# Patient Record
Sex: FEMALE | Race: BLACK OR AFRICAN AMERICAN | NOT HISPANIC OR LATINO | Employment: UNEMPLOYED | URBAN - METROPOLITAN AREA
[De-identification: names, ages, dates, MRNs, and addresses within clinical notes are randomized per-mention and may not be internally consistent; named-entity substitution may affect disease eponyms.]

---

## 2019-10-04 ENCOUNTER — TELEPHONE (OUTPATIENT)
Dept: NEUROLOGY | Facility: CLINIC | Age: 24
End: 2019-10-04

## 2019-10-17 ENCOUNTER — OFFICE VISIT (OUTPATIENT)
Dept: NEUROLOGY | Facility: CLINIC | Age: 24
End: 2019-10-17

## 2019-10-17 VITALS — SYSTOLIC BLOOD PRESSURE: 130 MMHG | DIASTOLIC BLOOD PRESSURE: 68 MMHG | HEART RATE: 89 BPM | WEIGHT: 140 LBS

## 2019-10-17 DIAGNOSIS — G40.909 EPILEPSY UNDETERMINED AS TO FOCAL OR GENERALIZED (HCC): Primary | ICD-10-CM

## 2019-10-17 PROCEDURE — 99215 OFFICE O/P EST HI 40 MIN: CPT | Performed by: PSYCHIATRY & NEUROLOGY

## 2019-10-17 RX ORDER — LEVETIRACETAM 250 MG/1
250 TABLET ORAL 2 TIMES DAILY
Status: ON HOLD | COMMUNITY
End: 2019-10-17 | Stop reason: SDUPTHER

## 2019-10-17 RX ORDER — LEVETIRACETAM 500 MG/1
500 TABLET ORAL EVERY 12 HOURS SCHEDULED
Status: ON HOLD | COMMUNITY
End: 2019-10-17 | Stop reason: SDUPTHER

## 2019-10-17 RX ORDER — LEVETIRACETAM 250 MG/1
250 TABLET ORAL
Qty: 90 TABLET | Refills: 3 | Status: SHIPPED | OUTPATIENT
Start: 2019-10-17 | End: 2019-10-28 | Stop reason: SDUPTHER

## 2019-10-17 RX ORDER — LEVETIRACETAM 500 MG/1
500 TABLET ORAL EVERY 12 HOURS SCHEDULED
Qty: 180 TABLET | Refills: 3 | Status: SHIPPED | OUTPATIENT
Start: 2019-10-17 | End: 2019-10-17 | Stop reason: SDUPTHER

## 2019-10-17 RX ORDER — LEVETIRACETAM 500 MG/1
500 TABLET ORAL EVERY 12 HOURS SCHEDULED
Qty: 180 TABLET | Refills: 3 | Status: SHIPPED | OUTPATIENT
Start: 2019-10-17 | End: 2019-10-28 | Stop reason: SDUPTHER

## 2019-10-17 RX ORDER — MEDROXYPROGESTERONE ACETATE 150 MG/ML
150 INJECTION, SUSPENSION INTRAMUSCULAR
COMMUNITY
End: 2021-12-10

## 2019-10-17 NOTE — PROGRESS NOTES
[place] [address] [facility] [address]  Initial Consultation    Impression/Plan    Ms Dave Bashir is a 25 y o  female with epilepsy due to unknown cause manifest as GTC seizures  Her neurological exam is normal   She had 2 seizures 10/2/2019 likely in the setting of some degree sleep deprivation in the postpartum period  She is experiencing daytime sedation related to levetiracetam   Will try decreasing morning dose to 500 mg and she will continue 750 mg at night  The dose could potentially be decreased further to 500 mg twice daily if potential side effects to improve  We discussed the pathophysiology of epilepsy/seizure and seizure safety/precautions  We discussed factors that can lower seizure threshold and the side effects of antiepileptic medications  Patient Instructions   1  Decrease levetiracetam to 500 mg in the morning and 750 mg at night  2  Let us know if there are still side effects in one week  3  Let us know if there are seizures  4  Return in 2 months  Diagnoses and all orders for this visit:    Epilepsy undetermined as to focal or generalized ([facility] [address] )  -     Discontinue: levETIRAcetam (KEPPRA) 500 mg tablet; Take 1 tablet (500 mg total) by mouth every 12 (twelve) hours  -     levETIRAcetam (KEPPRA) 250 mg tablet; Take 1 tablet (250 mg total) by mouth daily at bedtime  -     sertraline (ZOLOFT) 50 mg tablet; Take 1 tablet (50 mg total) by mouth daily  -     levETIRAcetam (KEPPRA) 500 mg tablet; Take 1 tablet (500 mg total) by mouth every 12 (twelve) hours          Dayanna Marie is a 25 y o  [place] speaking female presenting to the [place] [address] [facility] for hospital follow up regarding epilepsy  She has with her  a baby  Her  also only speaks [place]  An  was utilized by phone today  She was seen in consultation by our neuro hospitalist team at [facility] on 10/2/2019    Records from the [facility] admission were reviewed  At the time she was 6 months postpartum  Upon arriving at home the patient's  found her confused and altered  Later, he found her on the ground foaming at the mouth with eyes rolled backwards  He reported that she was thrashing around  She was unresponsive afterwards and was taken to the emergency department, arriving about 20 minutes later  About 5 minutes after arrival to the emergency department she had a second apparent convulsion  Emergency department note described the event as eyes rolling backwards with loss of consciousness  She was completely unresponsive  Duration estimated at 2 minutes  She was given 1 mg of Ativan and 1500 mg of levetiracetam      Her seizure prior to the most recent event was about 6 months ago  Seizures began when she was about 21years old  She was living in [place] at the time  She describes a warning of fatigue prior to her seizures  There has been about 6-7 seizures in her life  She denied ever taking an antiepileptic medication prior to her 10/1/2019 seizure  When she was seen in the hospital in October she reported that her  had witnessed her thrashing around file only in her sleep lately which had happened in the past, but was more frequent recently  Current AEDs:  Levetiracetam 750 mg bid  Medication side effects:  Tired  Medication adherence: Yes    Event/Seizure semiology:  Generalized tonic-clonic seizure (about 10 lifetime events, last on 10/2/2019 which prompted AED for the first time)    Special Features  Status epilepticus: no  Self Injury Seizures: no  Precipitating Factors: None known    Epilepsy Risk Factors:  None    Prior AEDs:  None    Prior Evaluation:  Brain MRI 10/3/2019 [facility] with without contrast: " Abnormal signal in the white matter of the posterior parietal lobes on the right and left side  Etiology includes demyelinating diseases multiple sclerosis   Vasculitis Lyme disease to be considered  "(I have not personally reviewed the images)    Routine EEG 10/2/2019 performed [facility]:  "Study was technically limited  There was no clear epileptiform feature "  (I have not personally reviewed the study)      History Reviewed: The following were reviewed and updated as appropriate: allergies, current medications, past family history, past medical history, past social history, past surgical history and problem list     Psychiatric History:  Depressed mood during postpartum period    Social History:   Driving: No  Lives Alone: No  Occupation: unknown occupation    ROS:  Constitutional: Negative  Negative for appetite change and fever  HENT: Negative  Negative for hearing loss, tinnitus, trouble swallowing and voice change  Eyes: Negative  Negative for photophobia and pain  Respiratory: Negative  Negative for shortness of breath  Cardiovascular: Negative  Negative for palpitations  Gastrointestinal: Negative  Negative for nausea and vomiting  Endocrine: Negative  Negative for cold intolerance and heat intolerance  Genitourinary: Negative  Negative for dysuria, frequency and urgency  Musculoskeletal: Negative  Negative for myalgias and neck pain  Skin: Negative  Negative for rash  Neurological: Positive for seizures  Negative for dizziness, tremors, syncope, facial asymmetry, speech difficulty, weakness, light-headedness, numbness and headaches  Hematological: Negative  Does not bruise/bleed easily  Psychiatric/Behavioral: Negative  Negative for confusion, hallucinations and sleep disturbance  ROS reviewed and updated as appropriate  Objective    /68 (BP Location: Left arm, Patient Position: Sitting, Cuff Size: Standard)   Pulse 89   Wt 63 5 kg (140 lb)      General Exam  General: well developed, no acute distress  HEENT: mucous membranes moist, anicteric sclera  Neck: good ROM  Extremities: no clubbing, cyanosis or edema     Skin: no rash on visible skin  Neurological Exam  Mental Status: awake, alert, and fully oriented to person, place, time, and situation  Attention intact and memory intact for recent events  Fund of knowledge is appropriate for age and education  There is no neglect  Language: fluency, naming, comprehension, and repetition normal        Cranial Nerves: Pupils equal and reactive to light  Visual fields full to confrontation  Extraocular motions intact with full versions, normal pursuits and saccades  Facial strength full and symmetric  Facial sensation intact in V1-V3  Hearing intact to finger rub bilaterally  Tongue protrudes to midline  Palate elevates symmetrically  Speech clear without notable dysarthria  Shoulder shrug activation full and symmetric  Motor: Normal bulk and tone  No pronator drift  Strength is 5/5 proximally and distally in all 4 extremities  No involuntary movements  Sensory: Sensation intact to light touch distally in all extremities  Coordination: Normal finger-to-nose  Station and gait: Casual and tandem gait normal  Normal Romberg  Reflexes: Reflexes 2+ throughout and symmetric  Briana Adkins MD   [facility]  [facility] [address] [facility]          Total time with patient today: 55 minutes  Greater than 50% of total time was spent with the patient and / or family counseling and / or coordination of care  A description of the counseling / coordination of care: discussed impression/plan in detail  See above

## 2019-10-17 NOTE — PATIENT INSTRUCTIONS
1  Decrease levetiracetam to 500 mg in the morning and 750 mg at night  2  Let us know if there are still side effects in one week  3  Let us know if there are seizures  4  Return in 2 months

## 2019-10-17 NOTE — PROGRESS NOTES
Review of Systems   Constitutional: Negative  Negative for appetite change and fever  HENT: Negative  Negative for hearing loss, tinnitus, trouble swallowing and voice change  Eyes: Negative  Negative for photophobia and pain  Respiratory: Negative  Negative for shortness of breath  Cardiovascular: Negative  Negative for palpitations  Gastrointestinal: Negative  Negative for nausea and vomiting  Endocrine: Negative  Negative for cold intolerance and heat intolerance  Genitourinary: Negative  Negative for dysuria, frequency and urgency  Musculoskeletal: Negative  Negative for myalgias and neck pain  Skin: Negative  Negative for rash  Neurological: Positive for seizures  Negative for dizziness, tremors, syncope, facial asymmetry, speech difficulty, weakness, light-headedness, numbness and headaches  Hematological: Negative  Does not bruise/bleed easily  Psychiatric/Behavioral: Negative  Negative for confusion, hallucinations and sleep disturbance

## 2019-10-28 DIAGNOSIS — G40.909 EPILEPSY UNDETERMINED AS TO FOCAL OR GENERALIZED (HCC): ICD-10-CM

## 2019-10-28 RX ORDER — LEVETIRACETAM 500 MG/1
500 TABLET ORAL EVERY 12 HOURS SCHEDULED
Qty: 180 TABLET | Refills: 3 | Status: SHIPPED | OUTPATIENT
Start: 2019-10-28 | End: 2020-05-08 | Stop reason: SDUPTHER

## 2019-10-28 RX ORDER — LEVETIRACETAM 250 MG/1
250 TABLET ORAL
Qty: 90 TABLET | Refills: 3 | Status: SHIPPED | OUTPATIENT
Start: 2019-10-28 | End: 2020-05-08 | Stop reason: SDUPTHER

## 2019-10-28 NOTE — TELEPHONE ENCOUNTER
Received call from Adamaris Mclain at [facility] asking for the [employer] scripts to be sent to [facility], reports that the printed scripts pt was given were used to obtain free medication, this will run out  Pt needs a script sent to [facility] for a 90 day supply       [phone] option 2

## 2019-12-19 ENCOUNTER — OFFICE VISIT (OUTPATIENT)
Dept: NEUROLOGY | Facility: CLINIC | Age: 24
End: 2019-12-19

## 2019-12-19 VITALS — WEIGHT: 137 LBS

## 2019-12-19 DIAGNOSIS — R45.89 DEPRESSED MOOD: ICD-10-CM

## 2019-12-19 DIAGNOSIS — G40.909 EPILEPSY UNDETERMINED AS TO FOCAL OR GENERALIZED (HCC): Primary | ICD-10-CM

## 2019-12-19 PROCEDURE — 99213 OFFICE O/P EST LOW 20 MIN: CPT | Performed by: PSYCHIATRY & NEUROLOGY

## 2019-12-19 NOTE — PATIENT INSTRUCTIONS
1  Continue levetiracetam 500 mg morning and 750 mg evening  2  Decrease sertraline to 25 mg daily (half pill) for 30 days and them stop  3  Return in 4 months  4  Let us know if there are seizures

## 2019-12-19 NOTE — PROGRESS NOTES
Review of Systems   Constitutional: Negative  Negative for appetite change and fever  HENT: Negative  Negative for hearing loss, tinnitus, trouble swallowing and voice change  Eyes: Negative  Negative for photophobia and pain  Respiratory: Negative  Negative for shortness of breath  Cardiovascular: Negative  Negative for palpitations  Gastrointestinal: Negative  Negative for nausea and vomiting  Endocrine: Negative  Negative for cold intolerance and heat intolerance  Genitourinary: Negative  Negative for dysuria, frequency and urgency  Musculoskeletal: Negative  Negative for myalgias and neck pain  Skin: Negative  Negative for rash  Neurological: Negative  Negative for dizziness, tremors, seizures, syncope, facial asymmetry, speech difficulty, weakness, light-headedness, numbness and headaches  Hematological: Negative  Does not bruise/bleed easily  Psychiatric/Behavioral: Negative for confusion, hallucinations and sleep disturbance  The patient is nervous/anxious (depression)

## 2019-12-19 NOTE — PROGRESS NOTES
[facility] [address]  Follow Up Visit    Impression/Plan    Ms Jung Santizo is a 25 y o  female with epilepsy due to unknown cause manifest as GTC seizures  Her neurological exam is normal   Her last 2 seizures were on 10/2/2019 prior to AED, likely in the setting of some degree sleep deprivation in the postpartum period  She is experiencing daytime sedation related to levetiracetam    there been no seizures since starting levetiracetam   Higher doses of levetiracetam may have contributed to daytime sedation  Care is complicated by lack health insurance  Depression has a significantly improved in part related to improvement in circumstances  She does not think sertraline as helped and does not think it is needed  I will have her wean off  Patient Instructions   1  Continue levetiracetam 500 mg morning and 750 mg evening  2  Decrease sertraline to 25 mg daily (half pill) for 30 days and them stop  3  Return in 4 months  4  Let us know if there are seizures  Diagnoses and all orders for this visit:    Epilepsy undetermined as to focal or generalized ([address] )  -     sertraline (ZOLOFT) 50 mg tablet; Take 0 5 tablets (25 mg total) by mouth daily    Depressed mood        Subjective    Shannan Rebolledo is returning to the [facility] for follow up  Interval Events:   Seizures since last visit: None  Hospitalizations: no    She arrives with her  and baby  They only speaks [place]   was used by phone today  There been no events concerning for seizure  No evidence of nocturnal seizure  No staring spells are concerning myoclonus  Mood has improved  She had been started on Zoloft when she was in the hospital October  Does not think she needs it and asking if she can come off  I provided a maintenance prescription at last visit  She is alert as permit for driving, but no 's license    She has not been driving since the seizure  They asked about when she can return to driving  They do not have health insurance and pay for the medication out of pocket  They get levetiracetam at [employer] and it is affordable for them  Current AEDs:  Levetiracetam 500 mg in the morning and 750 mg in the evening  Medication side effects: None  Medication adherence: Yes    Event/Seizure semiology:  Generalized tonic-clonic seizure (about 10 lifetime events, last on 10/2/2019 which prompted AED for the first time)     Special Features  Status epilepticus: no  Self Injury Seizures: no  Precipitating Factors: None known     Epilepsy Risk Factors:  None     Prior AEDs:  None     Prior Evaluation:  Brain MRI 10/3/2019 [facility] with without contrast: " Abnormal signal in the white matter of the posterior parietal lobes on the right and left side  Etiology includes demyelinating diseases multiple sclerosis  Vasculitis Lyme disease to be considered  "(I have not personally reviewed the images)     Routine EEG 10/2/2019 performed [facility]:  "Study was technically limited  There was no clear epileptiform feature "  (I have not personally reviewed the study)        History Reviewed: The following were reviewed and updated as appropriate: allergies, current medications, past medical history, past social history,  and problem list     Psychiatric History:  Depressed mood during postpartum period     Social History:   Driving: No  Lives Alone: No  Occupation: unknown occupation    ROS:  Constitutional: Negative  Negative for appetite change and fever  HENT: Negative  Negative for hearing loss, tinnitus, trouble swallowing and voice change  Eyes: Negative  Negative for photophobia and pain  Respiratory: Negative  Negative for shortness of breath  Cardiovascular: Negative  Negative for palpitations  Gastrointestinal: Negative  Negative for nausea and vomiting  Endocrine: Negative    Negative for cold intolerance and heat intolerance  Genitourinary: Negative  Negative for dysuria, frequency and urgency  Musculoskeletal: Negative  Negative for myalgias and neck pain  Skin: Negative  Negative for rash  Neurological: Negative  Negative for dizziness, tremors, seizures, syncope, facial asymmetry, speech difficulty, weakness, light-headedness, numbness and headaches  Hematological: Negative  Does not bruise/bleed easily  Psychiatric/Behavioral: Negative for confusion, hallucinations and sleep disturbance  The patient is nervous/anxious (depression)  ROS reviewed and updated as appropriate  Objective    Wt 62 1 kg (137 lb)      General Exam  No acute distress  Neurologic Exam  Mental Status:  Alert and oriented x 3  Language: normal fluency and comprehension  Cranial Nerves:  VFFTC  EOMI, no nystagums  Face symmetric  No dysarthria  Motor:  No drift  Strength 5/5 throughout  Coordination: Finger to nose intact  DTRs: Normal and symmetric (biceps, patella)  Gait: Normal casual gait

## 2020-04-17 ENCOUNTER — TELEPHONE (OUTPATIENT)
Dept: NEUROLOGY | Facility: CLINIC | Age: 25
End: 2020-04-17

## 2020-04-20 ENCOUNTER — TELEPHONE (OUTPATIENT)
Dept: NEUROLOGY | Facility: CLINIC | Age: 25
End: 2020-04-20

## 2020-05-07 ENCOUNTER — TELEPHONE (OUTPATIENT)
Dept: NEUROLOGY | Facility: CLINIC | Age: 25
End: 2020-05-07

## 2020-05-08 ENCOUNTER — TELEMEDICINE (OUTPATIENT)
Dept: NEUROLOGY | Facility: CLINIC | Age: 25
End: 2020-05-08

## 2020-05-08 VITALS — WEIGHT: 132.28 LBS

## 2020-05-08 DIAGNOSIS — G40.909 EPILEPSY UNDETERMINED AS TO FOCAL OR GENERALIZED (HCC): ICD-10-CM

## 2020-05-08 PROCEDURE — G2012 BRIEF CHECK IN BY MD/QHP: HCPCS | Performed by: PSYCHIATRY & NEUROLOGY

## 2020-05-08 RX ORDER — LEVETIRACETAM 500 MG/1
500 TABLET ORAL EVERY 12 HOURS SCHEDULED
Qty: 180 TABLET | Refills: 3 | Status: SHIPPED | OUTPATIENT
Start: 2020-05-08 | End: 2021-03-10 | Stop reason: SDUPTHER

## 2020-05-08 RX ORDER — LEVETIRACETAM 250 MG/1
250 TABLET ORAL
Qty: 90 TABLET | Refills: 3 | Status: SHIPPED | OUTPATIENT
Start: 2020-05-08 | End: 2021-03-10 | Stop reason: SDUPTHER

## 2021-03-03 ENCOUNTER — TELEPHONE (OUTPATIENT)
Dept: NEUROLOGY | Facility: CLINIC | Age: 26
End: 2021-03-03

## 2021-03-03 NOTE — TELEPHONE ENCOUNTER
Signed [employer] form received from Dr Carolina Salas  Faxed to [place] and placed at  to be scanned into chart

## 2021-03-03 NOTE — TELEPHONE ENCOUNTER
Called and discussed with the patient's  and patient  States that she stopped taking the medication in January because she found out that she is pregnant  She is currently 2 month pregnant  They are agreeable to the appointment on 3/10 in [place]  Patient scheduled  [employer] form completed and emailed to you to review and sign  Please email back and I will fax to [employer] and scanned into chart when completed

## 2021-03-03 NOTE — TELEPHONE ENCOUNTER
Patient's  calling to report a nocturnal seizure that the patient had last night  Said that it lasted for about a minute  No injuries reported  Said that the patient has not taken her AED for about 2 months now because she recently had a baby  He wanted to see if e could get a sooner appointment than her scheduled 4/1 appointment  Asking for any recommendations  Please advise    [phone]: [phone] Luda Gonzales to leave a detailed message

## 2021-03-03 NOTE — TELEPHONE ENCOUNTER
When I saw her in 5/2020 she was tolerating levetiracetam 500 mg AM / 750 mg PM without side effects  Please obtain more information about why she stopped levetiracetam  Was it because of depression in the postpartum period? I believe she did have postpartum depression in 2019, but then mood improved and I was not concerned at her last visit that levetiracetam was causing mood problems  Or was she concerned about fetal exposure to levetiracetam or breast feeding risk? Is she willing to restart the levetiracetam? I recommend that we restart a seizure medication now (levetiracetam or an alternative)  She had a learner's permit when I saw her in 12/2019  She should not be driving due to her recent seizure  Please complete [place] [employer] seizure reporting form  I can see her at 11:45 on 3/10 at [address] (emphasize location, past visits were at [facility])

## 2021-03-03 NOTE — TELEPHONE ENCOUNTER
I recommend she take levetiracetam during pregnancy  Levetiracetam is low risk in pregnancy and the benefit out weighs the risk related to having seizures during pregnancy

## 2021-03-04 NOTE — TELEPHONE ENCOUNTER
Spoke to patients   He will encourage patient to take medication  Aware of office visit 3/10  Will discuss further in office

## 2021-03-10 ENCOUNTER — OFFICE VISIT (OUTPATIENT)
Dept: NEUROLOGY | Facility: CLINIC | Age: 26
End: 2021-03-10

## 2021-03-10 VITALS — SYSTOLIC BLOOD PRESSURE: 122 MMHG | HEART RATE: 86 BPM | DIASTOLIC BLOOD PRESSURE: 68 MMHG | WEIGHT: 147 LBS

## 2021-03-10 DIAGNOSIS — G40.909 EPILEPSY AFFECTING PREGNANCY, ANTEPARTUM (HCC): ICD-10-CM

## 2021-03-10 DIAGNOSIS — O99.350 EPILEPSY AFFECTING PREGNANCY, ANTEPARTUM (HCC): ICD-10-CM

## 2021-03-10 DIAGNOSIS — G40.909 EPILEPSY UNDETERMINED AS TO FOCAL OR GENERALIZED (HCC): Primary | ICD-10-CM

## 2021-03-10 PROCEDURE — 99214 OFFICE O/P EST MOD 30 MIN: CPT | Performed by: PSYCHIATRY & NEUROLOGY

## 2021-03-10 RX ORDER — LEVETIRACETAM 500 MG/1
500 TABLET ORAL EVERY 12 HOURS SCHEDULED
Qty: 180 TABLET | Refills: 3 | Status: SHIPPED | OUTPATIENT
Start: 2021-03-10 | End: 2021-03-30

## 2021-03-10 RX ORDER — LEVETIRACETAM 250 MG/1
250 TABLET ORAL
Qty: 90 TABLET | Refills: 3 | Status: SHIPPED | OUTPATIENT
Start: 2021-03-10 | End: 2021-03-30

## 2021-03-10 NOTE — PROGRESS NOTES
[place] [address] Neurology [address] [facility]  Follow Up Visit    Impression/Plan    Ms Deanne French is a 22 y o  female with epilepsy due to unknown cause manifest as GTC seizures  Her neurological exam is normal   Her last 2 seizures were on 10/2/2019 prior to AED, likely in the setting of some degree sleep deprivation in the postpartum period  Higher doses of levetiracetam may have contributed to daytime sedation  Care is complicated by lack health insurance      There was depression in the past in the postpartum period  She did not think sertraline helped in the past and she stopped it in 12/2019  She is pregnant  Discussed pregnancy and epilepsy  Discussed that levetiracetam is low risk in pregnancy and the benefit outweighs the risk  She will restart levetiracetam   She needs to have a baseline levetiracetam level drawn and then at least one level each trimester given the tendency toward lower levetiracetam levels in pregnancy  She should have ED evaluation for a fetal nonstress test if there are seizures during pregnancy  She needs to establish with OB  Patient Instructions   1  Restart levetiracetam 500 mg morning and 750 mg at night  2  Have blood work done in about one week and then again in 3 months  3  Return in about 3 months  Diagnoses and all orders for this visit:    Epilepsy undetermined as to focal or generalized ([address] )  -     levETIRAcetam (KEPPRA) 500 mg tablet; Take 1 tablet (500 mg total) by mouth every 12 (twelve) hours  -     levETIRAcetam (KEPPRA) 250 mg tablet; Take 1 tablet (250 mg total) by mouth daily at bedtime  -     Levetiracetam level; Future  -     Levetiracetam level; Future    Epilepsy affecting pregnancy, antepartum ([address] )        Dayanna    Jose Martin Hummel is returning to the [place] [address] [facility] for follow up  Interval Events:   [employer] [facility]  [id number]   provides most history   Patient does not engage unless specifically addressed  She is pregnant  She stopped levetiracetam about 2 months ago when she learned she was pregnant  She tells me she is two months pregnant  She had a nocturnal witnessed seizure on the night of 3/2  She denies any side effects related to levetiracetam prior to stopping  There was postpartum depression with her last pregnancy  She start levetiracetam after her child was born  Current AEDs:  None      Objective    /68 (BP Location: Left arm, Patient Position: Sitting, Cuff Size: Standard)   Pulse 86   Wt 66 7 kg (147 lb)      General Exam  No acute distress  Neurologic Exam  Mental Status:  Alert and oriented x 3  Language: normal fluency and comprehension  Cranial Nerves: Face symmetric  No dysarthria  Gait: Normal casual gait  ROS:    Review of Systems   Constitutional: Negative  Negative for appetite change and fever  HENT: Negative  Negative for hearing loss, tinnitus, trouble swallowing and voice change  Eyes: Negative  Negative for photophobia and pain  Respiratory: Negative  Negative for shortness of breath  Cardiovascular: Negative  Negative for palpitations  Gastrointestinal: Negative  Negative for nausea and vomiting  Endocrine: Negative  Negative for cold intolerance  Genitourinary: Negative  Negative for dysuria, frequency and urgency  Musculoskeletal: Negative  Negative for myalgias and neck pain  Skin: Negative  Negative for rash  Neurological: Positive for dizziness and headaches  Negative for tremors, seizures, syncope, facial asymmetry, speech difficulty, weakness, light-headedness and numbness  Hematological: Negative  Does not bruise/bleed easily  Psychiatric/Behavioral: Negative  Negative for confusion, hallucinations and sleep disturbance  ROS reviewed and updated as appropriate

## 2021-03-10 NOTE — PATIENT INSTRUCTIONS
1  Restart levetiracetam 500 mg morning and 750 mg at night  2  Have blood work done in about one week and then again in 3 months  3  Return in about 3 months

## 2021-03-26 LAB — LEVETIRACETAM SERPL-MCNC: <1 MCG/ML (ref 12–46)

## 2021-03-29 ENCOUNTER — TELEPHONE (OUTPATIENT)
Dept: NEUROLOGY | Facility: CLINIC | Age: 26
End: 2021-03-29

## 2021-03-29 DIAGNOSIS — G40.909 EPILEPSY UNDETERMINED AS TO FOCAL OR GENERALIZED (HCC): ICD-10-CM

## 2021-03-29 NOTE — TELEPHONE ENCOUNTER
Levetiracetam level collected 3/23 at 10:30 is undetectable  Please confirm that she was taking levetiracetam as prescribed during the 3-5 days leading up to the lab draw  Also confirm timing of last dose prior to the collection  If there is no adequate explanation for the low level she will need to increase her dose  Higher doses are often required during pregnancy

## 2021-03-30 RX ORDER — LEVETIRACETAM 500 MG/1
1000 TABLET ORAL EVERY 12 HOURS SCHEDULED
Qty: 120 TABLET | Refills: 11 | Status: SHIPPED | OUTPATIENT
Start: 2021-03-30 | End: 2021-06-11 | Stop reason: SDUPTHER

## 2021-03-30 NOTE — TELEPHONE ENCOUNTER
Called and discussed with patient  States that she has been taking her medication  The day that she had her labs drawn she did not take her medication until she got home from the lab  She has not had any additional seizures      Please advise

## 2021-03-30 NOTE — TELEPHONE ENCOUNTER
I recommend increasing levetiracetam to 750 mg twice daily for 3 days and then 1000 mg twice daily  A level can be repeated after taking the target dose for one week  I know that she had side effects on doses similar to 1000 mg twice daily in the past, but I expect she will tolerate this dose during pregnancy when her blood levels of levetiracetam are lower

## 2021-03-30 NOTE — TELEPHONE ENCOUNTER
Called using a [employer] interpretor  Asked to call back around 3:30 when patient gets off of work  She can be contacted at [phone]

## 2021-03-31 LAB
EXTERNAL HIV SCREEN: NORMAL
HCV AB SER-ACNC: NORMAL

## 2021-03-31 NOTE — TELEPHONE ENCOUNTER
Attempted to contact patient at [phone]  No answer  Unable to leave message due to mailbox not being set up  [facility] interpretor Hany Garcia #[phone]

## 2021-04-01 NOTE — TELEPHONE ENCOUNTER
Called and made patient aware  She is agreeable to increasing her Keppra dose  She will get bw drawn after being on 1000 mg BID for 1 week

## 2021-05-11 ENCOUNTER — TRANSCRIBE ORDERS (OUTPATIENT)
Dept: PERINATAL CARE | Facility: CLINIC | Age: 26
End: 2021-05-11

## 2021-05-11 ENCOUNTER — TELEPHONE (OUTPATIENT)
Dept: PERINATAL CARE | Facility: CLINIC | Age: 26
End: 2021-05-11

## 2021-05-11 DIAGNOSIS — O09.899 SUPERVISION OF OTHER HIGH RISK PREGNANCIES, UNSPECIFIED TRIMESTER: Primary | ICD-10-CM

## 2021-05-12 ENCOUNTER — TELEPHONE (OUTPATIENT)
Dept: PERINATAL CARE | Facility: CLINIC | Age: 26
End: 2021-05-12

## 2021-05-12 NOTE — TELEPHONE ENCOUNTER
Pt had DETAILED US yesterday at [facility]  I called Nydia Arreaga to see if pt needed US today  Nydia Arreaga said there was a miscommunication and [facility] issued referral for [facility] and that referral was fax'd here in error  Per Nydia Arreaga, pt DOES NOT need appt here    I cancelled appt and informed pt of her next appt with [facility] tomorrow at 2pm

## 2021-06-03 ENCOUNTER — TELEPHONE (OUTPATIENT)
Dept: NEUROLOGY | Facility: CLINIC | Age: 26
End: 2021-06-03

## 2021-06-03 ENCOUNTER — OFFICE VISIT (OUTPATIENT)
Dept: NEUROLOGY | Facility: CLINIC | Age: 26
End: 2021-06-03

## 2021-06-03 VITALS — SYSTOLIC BLOOD PRESSURE: 102 MMHG | DIASTOLIC BLOOD PRESSURE: 54 MMHG | WEIGHT: 153.6 LBS | HEART RATE: 93 BPM

## 2021-06-03 DIAGNOSIS — O99.350: ICD-10-CM

## 2021-06-03 DIAGNOSIS — G40.909: ICD-10-CM

## 2021-06-03 DIAGNOSIS — G40.909 EPILEPSY UNDETERMINED AS TO FOCAL OR GENERALIZED (HCC): Primary | ICD-10-CM

## 2021-06-03 PROCEDURE — 99214 OFFICE O/P EST MOD 30 MIN: CPT | Performed by: PSYCHIATRY & NEUROLOGY

## 2021-06-03 RX ORDER — FERROUS SULFATE 325(65) MG
1 TABLET ORAL DAILY
COMMUNITY
Start: 2021-04-01

## 2021-06-03 NOTE — PROGRESS NOTES
[place] [address] Neurology [address] [facility]  Follow Up Visit    Impression/Plan    Ms  Cary Galloway is a 32 y o  female with epilepsy due to unknown cause manifest as GTC seizures  Her neurological exam is normal   Most recent seizure occurred in March 2021 while she was off levetiracetam shortly after discovering she was pregnant  Seizures prior to that were on 10/2/2019 prior to initiation of AED therapy, likely in the setting of some degree sleep deprivation in the postpartum period  Care is complicated by lack health insurance  There was depression in the past in the postpartum period   She did not think sertraline helped in the past and she stopped it in 12/2019      She is about 23 weeks gestation with estimated delivery date 9/22/2021  No seizures since restarting levetiracetam, but serum level was undetectable when last checked  Level has not been rechecked since dose was increased to 1000 mg bid  Will check level this week, targeting a level that is at least above the lower level of normal   Will need to continue to monitor levetiracetam levels during pregnancy  Patient Instructions   1  Continue levetiracetam 1000 mg twice daily  2  Have levetiracetam level drawn in the next week  Call us if you do not hear about the results within 5 days  3  Another levetiracetam level should be drawn in the middle of July  4  Return in about 6 months  Diagnoses and all orders for this visit:    Epilepsy undetermined as to focal or generalized ([address] )  -     Levetiracetam level; Standing  -     Levetiracetam level    Epilepsy during pregnancy ([address] )    Other orders  -     ferrous sulfate 325 (65 Fe) mg tablet; Take 1 tablet by mouth daily        Dayanna Rincon is returning to the [place] [address] [facility] for follow up       Interval Events:   Seizures since last visit: None (last seizure 3/2/2021 in the setting of not taking levetiracetam during early pregnancy)    [facility]  used by phone today  Patient arrives alone  She was last seen on 3/10/2021  There was a witnessed generalized tonic-clonic seizure on 3/2/2021 about 1 week prior to that visit  At that visit she was about 2 months pregnant and she had stopped levetiracetam when she learned she was pregnant  She was instructed to start levetiracetam 500 mg in morning 750 mg at night  Levetiracetam level was undetectable when checked on March 23rd  She was instructed to increase levetiracetam to 1000 mg bid and have a level checked 1 week later  Current gestational age is approximately 23 weeks  Her estimated delivery date is 9/22/2021  She has been feeling dizzy lately  Current AEDs:  Levetiracetam 1000 mg bid  Medication side effects: None  Medication adherence: Yes        Objective    /54 (BP Location: Left arm, Patient Position: Sitting, Cuff Size: Adult)   Pulse 93   Wt 69 7 kg (153 lb 9 6 oz)   LMP 12/16/2020      General Exam  No acute distress  Neurologic Exam  Mental Status:  Alert and oriented  Language: normal fluency and comprehension  Cranial Nerves:  VFFTC  EOMI, no nystagums  Face symmetric  No dysarthria  Gait: Normal casual gait  ROS:    Review of Systems   Constitutional: Negative  Negative for appetite change and fever  HENT: Negative  Negative for hearing loss, tinnitus, trouble swallowing and voice change  Eyes: Negative  Negative for photophobia and pain  Respiratory: Negative  Negative for shortness of breath  Cardiovascular: Negative  Negative for palpitations  Gastrointestinal: Negative  Negative for nausea and vomiting  Endocrine: Negative  Negative for cold intolerance  Genitourinary: Negative  Negative for dysuria, frequency and urgency  Musculoskeletal: Negative  Negative for myalgias and neck pain  Skin: Negative  Negative for rash  Neurological: Positive for dizziness (very often )   Negative for tremors, seizures, syncope, facial asymmetry, speech difficulty, weakness, light-headedness, numbness and headaches  Hematological: Negative  Does not bruise/bleed easily  Psychiatric/Behavioral: Negative  Negative for confusion, hallucinations and sleep disturbance  All other systems reviewed and are negative  ROS reviewed and updated as appropriate

## 2021-06-03 NOTE — TELEPHONE ENCOUNTER
Will call Jason Ramires [phone] as did not post 30 00 on credit card, posted as cash in error patient did not pay cash

## 2021-06-03 NOTE — PATIENT INSTRUCTIONS
1  Continue levetiracetam 1000 mg twice daily  2  Have levetiracetam level drawn in the next week  Call us if you do not hear about the results within 5 days  3  Another levetiracetam level should be drawn in the middle of July  4  Return in about 6 months

## 2021-06-04 NOTE — TELEPHONE ENCOUNTER
Called patient Kamran Renee on the line [place] ref #[id number], explained in detail we did not post 30 00 deposit to her credit card as was posted as cash in error, we will still honor 50% self pay discount [facility] will be sent a statement for the [id number] office visit

## 2021-06-11 DIAGNOSIS — G40.909 EPILEPSY UNDETERMINED AS TO FOCAL OR GENERALIZED (HCC): ICD-10-CM

## 2021-06-11 DIAGNOSIS — G40.909 EPILEPSY AFFECTING PREGNANCY, ANTEPARTUM (HCC): Primary | ICD-10-CM

## 2021-06-11 DIAGNOSIS — O99.350 EPILEPSY AFFECTING PREGNANCY, ANTEPARTUM (HCC): Primary | ICD-10-CM

## 2021-06-11 LAB — LEVETIRACETAM SERPL-MCNC: <1 MCG/ML (ref 12–46)

## 2021-06-11 RX ORDER — LEVETIRACETAM 500 MG/1
1500 TABLET ORAL EVERY 12 HOURS SCHEDULED
Qty: 180 TABLET | Refills: 11 | Status: SHIPPED | OUTPATIENT
Start: 2021-06-11 | End: 2021-07-06 | Stop reason: SDUPTHER

## 2021-06-11 NOTE — TELEPHONE ENCOUNTER
Called patient with [facility] interpretor Ana [facility] [phone]  Confirmed patient is taking levetiracetam 1000mg BID  Denies any missed doses of medication  Takes medication at 10am and 10pm  Medications were not taken until AFTER labs were drawn  (Labs drawn around 10:30)  Patient agreeable to medication increase and repeat lab levels  Please sign off on scripts  New lab level entered

## 2021-06-11 NOTE — TELEPHONE ENCOUNTER
The patient is pregnant and her levetiracetam level is undetectable  Please confirm compliance with levetiracetam 1000 mg twice daily for the 3 days leading up to the collection  Was this a trough level? Please take extra care to confirm history given language barrier  If there is no explanation for the low level then I recommend increasing levetiracetam to 1500 mg twice daily now and then rechecking level one week after dose increase

## 2021-07-01 LAB — LEVETIRACETAM SERPL-MCNC: 3.3 MCG/ML (ref 12–46)

## 2021-07-06 ENCOUNTER — TELEPHONE (OUTPATIENT)
Dept: OTHER | Facility: HOSPITAL | Age: 26
End: 2021-07-06

## 2021-07-06 DIAGNOSIS — G40.909 EPILEPSY UNDETERMINED AS TO FOCAL OR GENERALIZED (HCC): ICD-10-CM

## 2021-07-06 RX ORDER — LEVETIRACETAM 500 MG/1
2000 TABLET ORAL EVERY 12 HOURS SCHEDULED
Qty: 240 TABLET | Refills: 3 | Status: SHIPPED | OUTPATIENT
Start: 2021-07-06 | End: 2021-12-10 | Stop reason: SDUPTHER

## 2021-07-06 NOTE — TELEPHONE ENCOUNTER
Patient is pregnant with OZ of 9/22/2021  Levetiracetam level is now detectable, but remains low  As long as there were no missed doses, increase levetiracetam to 2000 mg bid and recheck level in 1 week

## 2021-07-06 NOTE — TELEPHONE ENCOUNTER
Called and made patient aware  She is agreeable to increase in dose and rechecking her levels  Please place blood work order

## 2021-12-10 ENCOUNTER — OFFICE VISIT (OUTPATIENT)
Dept: NEUROLOGY | Facility: CLINIC | Age: 26
End: 2021-12-10
Payer: COMMERCIAL

## 2021-12-10 VITALS
WEIGHT: 159.6 LBS | DIASTOLIC BLOOD PRESSURE: 62 MMHG | RESPIRATION RATE: 18 BRPM | SYSTOLIC BLOOD PRESSURE: 108 MMHG | BODY MASS INDEX: 29.37 KG/M2 | HEIGHT: 62 IN | TEMPERATURE: 97.8 F | HEART RATE: 65 BPM

## 2021-12-10 DIAGNOSIS — G40.909 EPILEPSY UNDETERMINED AS TO FOCAL OR GENERALIZED (HCC): ICD-10-CM

## 2021-12-10 PROCEDURE — 99214 OFFICE O/P EST MOD 30 MIN: CPT | Performed by: PSYCHIATRY & NEUROLOGY

## 2021-12-10 RX ORDER — NORGESTIMATE AND ETHINYL ESTRADIOL 0.25-0.035
KIT ORAL
COMMUNITY
Start: 2021-12-02

## 2021-12-10 RX ORDER — LEVETIRACETAM 500 MG/1
500 TABLET ORAL EVERY 12 HOURS SCHEDULED
Qty: 180 TABLET | Refills: 3 | Status: SHIPPED | OUTPATIENT
Start: 2021-12-10 | End: 2022-06-09 | Stop reason: SDUPTHER

## 2022-06-08 ENCOUNTER — TELEPHONE (OUTPATIENT)
Dept: SLEEP CENTER | Facility: CLINIC | Age: 27
End: 2022-06-08

## 2022-06-08 NOTE — TELEPHONE ENCOUNTER
Spoke with patient  Patient asked to reschedule appointment scheduled for 6/9/22  Advised patient that next melani or Dr Chuy Her would not be until October 2022   Patient agreed to keep appointment for tomorrow 6/9/22 at 10:15 am

## 2022-06-08 NOTE — TELEPHONE ENCOUNTER
Patient left message on sleep center nurses line  States he needs to cancel his appointment for tomorrow 6/9/22  Would like a call back

## 2022-06-09 ENCOUNTER — OFFICE VISIT (OUTPATIENT)
Dept: NEUROLOGY | Facility: CLINIC | Age: 27
End: 2022-06-09
Payer: COMMERCIAL

## 2022-06-09 ENCOUNTER — TELEPHONE (OUTPATIENT)
Dept: NEUROLOGY | Facility: CLINIC | Age: 27
End: 2022-06-09

## 2022-06-09 VITALS
OXYGEN SATURATION: 98 % | SYSTOLIC BLOOD PRESSURE: 110 MMHG | HEIGHT: 62 IN | TEMPERATURE: 97.6 F | HEART RATE: 70 BPM | DIASTOLIC BLOOD PRESSURE: 72 MMHG | WEIGHT: 148 LBS | BODY MASS INDEX: 27.23 KG/M2

## 2022-06-09 DIAGNOSIS — G40.909 EPILEPSY UNDETERMINED AS TO FOCAL OR GENERALIZED (HCC): ICD-10-CM

## 2022-06-09 PROCEDURE — 99213 OFFICE O/P EST LOW 20 MIN: CPT | Performed by: PSYCHIATRY & NEUROLOGY

## 2022-06-09 RX ORDER — LEVETIRACETAM 500 MG/1
500 TABLET ORAL EVERY 12 HOURS SCHEDULED
Qty: 180 TABLET | Refills: 3 | Status: SHIPPED | OUTPATIENT
Start: 2022-06-09

## 2022-06-09 NOTE — LETTER
June 9, 2022     Patient: Lila Davis  YOB: 1995      To Whom it May Concern:    Geovanny Friday is under my professional care  Boyd Summers was seen in my office on 6/9/2022  She would benefit from having another adult family member available to be with her if she were to have another seizure, in order to provide first aid, support recovery and care for her children while she recovers  Additionally, support caring for her children and other daily tasks will reduce the potential for stress and sleep deprivation that have brought on her seizures in the past  I understand that Boyd Summers is making the very reasonable request that [facility] receive permission to reside with her in the [facility] to provide this support            Sincerely,          Joselin Pederson MD   [facility]  [facility] [address] [facility] Neurology and Epilepsy

## 2022-06-09 NOTE — PATIENT INSTRUCTIONS
Continue levetiracetam 500 mg twice daily  Return in about 9 months  Let us know if there are seizures

## 2022-06-09 NOTE — TELEPHONE ENCOUNTER
Letter drafted and signed  Placed at [employer] in [facility]  Please notify patient and/or mail/email to her

## 2022-06-09 NOTE — TELEPHONE ENCOUNTER
Patient's  has given us the name [facility] as the person whom they want to help the patient  They are asking that we send a copy of the letter to Lili Villarreal and a another copy to the email address we have on file  The email address he has given for the [facility] is [email or web address]

## 2022-06-09 NOTE — PROGRESS NOTES
[place] [address] Neurology [address] [facility]  Follow Up Visit    Impression/Plan    Ms Darell Corcoran is a 32 y o  female with epilepsy due to unknown cause manifest as GTC seizures  Her neurological exam is normal   Most recent seizure occurred in March 2021 while she was off levetiracetam shortly after discovering she was pregnant   Seizures prior to that were on 10/2/2019 prior to initiation of AED therapy, likely in the setting of some degree sleep deprivation in the postpartum period      Patient Instructions   1  Continue levetiracetam 500 mg twice daily  2  Return in about 9 months  3  Let us know if there are seizures  Diagnoses and all orders for this visit:    Epilepsy undetermined as to focal or generalized ([facility] [address] )  -     levETIRAcetam (KEPPRA) 500 mg tablet; Take 1 tablet (500 mg total) by mouth every 12 (twelve) hours        Subjective    Fito Comas is returning to the [place] [address] [facility] for follow up  Interval Events:   Seizures since last visit: None  Hospitalizations: no    No events concerning for seizure  No mood changes  Work is going ok  Sometimes she is alone at home caring for her children when her  is at work  Current AEDs:  Levetiracetam 500 mg twice daily  Medication side effects: higher dose caused headache  Medication adherence: yes      Event/Seizure semiology:  Generalized tonic-clonic seizure (about 10 lifetime events, last on 10/2/2019 which prompted AED for the first time)     Special Features  Status epilepticus: no  Self Injury Seizures: no  Precipitating Factors: None known     Epilepsy Risk Factors:  None     Prior AEDs:  None     Prior Evaluation:  Brain MRI 10/3/2019 [facility] with without contrast: " Abnormal signal in the white matter of the posterior parietal lobes on the right and left side   Etiology includes demyelinating diseases multiple sclerosis  Vasculitis Lyme disease to be considered  "(I have not personally reviewed the images)     Routine EEG 10/2/2019 performed [facility]:  "Study was technically limited   There was no clear epileptiform feature "  (I have not personally reviewed the study)      Objective    /72 (BP Location: Left arm, Patient Position: Sitting, Cuff Size: Standard)   Pulse 70   Temp 97 6 °F (36 4 °C) (Temporal)   Ht 5' 2" (1 575 m)   Wt 67 1 kg (148 lb)   SpO2 98%   BMI 27 07 kg/m²      General Exam  No acute distress  Neurologic Exam  Mental Status:  Alert and oriented x 3  Language: normal fluency and comprehension  Cranial Nerves:  VFFTC  EOMI, no nystagums  Face symmetric  No dysarthria  Gait: Normal casual gait

## 2023-07-29 DIAGNOSIS — G40.909 EPILEPSY UNDETERMINED AS TO FOCAL OR GENERALIZED (HCC): ICD-10-CM

## 2023-07-31 RX ORDER — LEVETIRACETAM 500 MG/1
500 TABLET ORAL EVERY 12 HOURS
Qty: 180 TABLET | Refills: 0 | Status: SHIPPED | OUTPATIENT
Start: 2023-07-31

## 2023-09-29 ENCOUNTER — TELEPHONE (OUTPATIENT)
Dept: NEUROLOGY | Facility: CLINIC | Age: 28
End: 2023-09-29

## 2023-09-29 NOTE — TELEPHONE ENCOUNTER
Pt called in with the help of an  I was able to make a f/u appt with Dr. Edison Kendall on 11/3 @ 1:00 with Dr. Edison Kendall in [facility]. Made pt aware she is going to need PCP referral before appt.

## 2023-11-02 ENCOUNTER — TELEPHONE (OUTPATIENT)
Dept: NEUROLOGY | Facility: CLINIC | Age: 28
End: 2023-11-02

## 2023-11-02 NOTE — TELEPHONE ENCOUNTER
10/31/23 at [address] [place] [address].  prescription for [facility]. Prescription for [facility]. Desire Elizondo. Thank you. Bye. Call me back [phone].

## 2023-11-03 ENCOUNTER — OFFICE VISIT (OUTPATIENT)
Dept: NEUROLOGY | Facility: CLINIC | Age: 28
End: 2023-11-03

## 2023-11-03 VITALS
BODY MASS INDEX: 29.4 KG/M2 | OXYGEN SATURATION: 98 % | DIASTOLIC BLOOD PRESSURE: 72 MMHG | HEIGHT: 62 IN | SYSTOLIC BLOOD PRESSURE: 128 MMHG | TEMPERATURE: 98 F | WEIGHT: 159.8 LBS | HEART RATE: 67 BPM

## 2023-11-03 DIAGNOSIS — G40.909 EPILEPSY UNDETERMINED AS TO FOCAL OR GENERALIZED (HCC): ICD-10-CM

## 2023-11-03 PROCEDURE — 99213 OFFICE O/P EST LOW 20 MIN: CPT | Performed by: PSYCHIATRY & NEUROLOGY

## 2023-11-03 RX ORDER — LEVETIRACETAM 500 MG/1
500 TABLET ORAL EVERY 12 HOURS
Qty: 180 TABLET | Refills: 3 | Status: SHIPPED | OUTPATIENT
Start: 2023-11-03

## 2023-11-03 NOTE — PATIENT INSTRUCTIONS
Continue current seizure medication unchanged. Let us know if there are seizures or problems with your medication. Return in one year.  (AP)

## 2023-11-03 NOTE — PROGRESS NOTES
[facility] Neurology [address] [facility]  Follow Up Visit    Impression/Plan    Ms. Adali Toscano is a 29 y.o. female with epilepsy due to unknown cause manifest as GTC seizures. Her neurological exam is normal.  Most recent seizure occurred in March 2021 while she was off levetiracetam shortly after discovering she was pregnant. Seizures prior to that were on 10/2/2019 prior to initiation of AED therapy, likely in the setting of some degree sleep deprivation in the postpartum period. Reviewed epilepsy and pregnancy again today. No immediate plans for pregnancy. She knows to let us know right away if she become pregnant and we would then monitor levetiracetam levels at least once per trimester. Patient Instructions   Continue current seizure medication unchanged. Let us know if there are seizures or problems with your medication. Return in one year. (AP)    Diagnoses and all orders for this visit:    Epilepsy undetermined as to focal or generalized ([address])  -     levETIRAcetam (KEPPRA) 500 mg tablet; Take 1 tablet (500 mg total) by mouth every 12 (twelve) hours        Dayanna Bahena is returning to the [facility] for follow up. Interval Events:   Seizures since last visit: None  Hospitalizations: no    [facility] language interpretor utilized for visit. Arrives with 2 children and . No events concerning for seizure. No auras. Mood stable. Busy caring for her young children.      [employer] (mother in law)    Current AEDs:  Levetiracetam 500 mg twice daily  Medication side effects: higher dose caused headache  Medication adherence: yes      Event/Seizure semiology:  Generalized tonic-clonic seizure (about 10 lifetime events, last on 10/2/2019 which prompted AED for the first time)     Special Features  Status epilepticus: no  Self Injury Seizures: no  Precipitating Factors: None known     Epilepsy Risk Factors:  None     Prior AEDs:  None     Prior Evaluation:  Brain MRI 10/3/2019 [facility] with without contrast: " Abnormal signal in the white matter of the posterior parietal lobes on the right and left side. Etiology includes demyelinating diseases multiple sclerosis. Vasculitis Lyme disease to be considered. "(I have not personally reviewed the images)     Routine EEG 10/2/2019 performed [facility]:  "Study was technically limited. There was no clear epileptiform feature."  (I have not personally reviewed the study)      Objective    /72 (BP Location: Right arm, Patient Position: Sitting, Cuff Size: Large)   Pulse 67   Temp 98 °F (36.7 °C) (Temporal)   Ht 5' 2" (1.575 m)   Wt 72.5 kg (159 lb 12.8 oz)   SpO2 98%   BMI 29.23 kg/m²      General Exam  No acute distress. Neurologic Exam  Mental Status:  Alert and oriented x 3. Language: normal fluency and comprehension. Cranial Nerves: Face symmetric. No dysarthria. Gait: Normal casual gait.

## 2023-11-03 NOTE — LETTER
11/3/2023     Patient:            Terry Graff  YOB: 1995        To Whom it May Concern:     Harley Gonzalez is under my professional care. Keaton Kathleen was most  recently seen in my office on 11/3/2023. She would benefit from having another adult family member available to be with her if she were to have another seizure, in order to provide first aid, support recovery and care for her children while she recovers. Additionally, support caring for her children and other daily tasks will reduce the potential for stress and sleep deprivation that have brought on her seizures in the past. I understand that Keaton Kathleen is making the reasonable request that Harleen Buckley receive permission to reside with her in the [place] to provide this support.             Sincerely,            Ravinder Martinez MD   [facility]  [address] [facility] Neurology and Epilepsy

## 2024-10-17 ENCOUNTER — TELEPHONE (OUTPATIENT)
Age: 29
End: 2024-10-17

## 2024-10-17 NOTE — TELEPHONE ENCOUNTER
Patients spouse called in with patient to R/S the appt with Kristin VAIL office 11/4/24.     Patient is now scheduled for 11/20/24 2:30 pm Kristin VAIL office.   Patient has off Wednesdays.     FYI

## 2024-11-20 ENCOUNTER — OFFICE VISIT (OUTPATIENT)
Dept: NEUROLOGY | Facility: CLINIC | Age: 29
End: 2024-11-20

## 2024-11-20 VITALS
RESPIRATION RATE: 18 BRPM | HEIGHT: 62 IN | HEART RATE: 63 BPM | OXYGEN SATURATION: 99 % | WEIGHT: 158 LBS | SYSTOLIC BLOOD PRESSURE: 110 MMHG | DIASTOLIC BLOOD PRESSURE: 60 MMHG | TEMPERATURE: 97.7 F | BODY MASS INDEX: 29.08 KG/M2

## 2024-11-20 DIAGNOSIS — G40.909 EPILEPSY UNDETERMINED AS TO FOCAL OR GENERALIZED (HCC): Primary | ICD-10-CM

## 2024-11-20 PROCEDURE — 99213 OFFICE O/P EST LOW 20 MIN: CPT | Performed by: PHYSICIAN ASSISTANT

## 2024-11-20 RX ORDER — LEVETIRACETAM 500 MG/1
500 TABLET ORAL EVERY 12 HOURS
Qty: 180 TABLET | Refills: 3 | Status: SHIPPED | OUTPATIENT
Start: 2024-11-20

## 2024-11-20 NOTE — PROGRESS NOTES
Name: Masha Gonzalez      : 1995      MRN: [id number]  Encounter Provider: Kristin Ch PA-C  Encounter Date: 2024   Encounter department: [facility]    :  Assessment & Plan  Epilepsy undetermined as to focal or generalized (HCC)  Ms. Gonzalez is a 29 y.o. female with epilepsy due to unknown cause manifest as GTC seizures. Her neurological exam is normal.  Most recent seizure occurred in 2021 while she was off levetiracetam shortly after discovering she was pregnant.  Seizures prior to that were on 10/2/2019 prior to initiation of AED therapy, likely in the setting of some degree sleep deprivation in the postpartum period.     She remains seizure-free and is tolerating levetiracetam well.  Again reviewed epilepsy and pregnancy.  She is on birth control and not planning pregnancy in the near future.  She knows to contact the office right away if she were to become pregnant so that we could monitor her levetiracetam levels and adjust dosing accordingly during a pregnancy.     Plan:  - continue levetiracetam 500mg BID  - call the office if there are seizures or problems with the medication  - let our office know if you become pregnant for close monitoring  - follow up in 1 year or sooner if needed  Orders:    levETIRAcetam (KEPPRA) 500 mg tablet; Take 1 tablet (500 mg total) by mouth every 12 (twelve) hours        History of Present Illness   HPI  Masha Gonzalez is returning to the [facility] for follow up.      Interval Events:   Seizures since last visit: None  Hospitalizations: no     She was last seen 1 year ago by Dr. De La Rosa.  She remained seizure-free at that time.    [place]  via iPad utilized for visit.   She reports she has been doing well over the last year.  She has not had any events concerning for seizure.  She continues on levetiracetam 500mg BID, taking regularly.  She took her last dose  "this AM and needs a refill.  She is on birth control and denies plans for pregnancy in the near future.     Current AEDs:  Levetiracetam 500 mg twice daily  Medication side effects: higher dose caused headache  Medication adherence: yes      Event/Seizure semiology:  Generalized tonic-clonic seizure (about 10 lifetime events, last on 10/2/2019 which prompted AED for the first time)     Special Features  Status epilepticus: no  Self Injury Seizures: no  Precipitating Factors: None known     Epilepsy Risk Factors:  None     Prior AEDs:  None     Prior Evaluation:  Brain MRI 10/3/2019 [facility] with without contrast: \" Abnormal signal in the white matter of the posterior parietal lobes on the right and left side.  Etiology includes demyelinating diseases. multiple sclerosis, Vasculitis, Lyme disease to be considered.\"(I have not personally reviewed the images)     Routine EEG 10/2/2019 performed [facility]:  \"Study was technically limited.  There was no clear epileptiform feature.\"  (I have not personally reviewed the study)    Psychiatric History:  Depressed mood during postpartum period     Social History:   Driving: No  Lives Alone: No  Occupation: unknown occupation      Review of Systems   Constitutional: Negative.  Negative for fatigue and fever.   HENT: Negative.  Negative for hearing loss, tinnitus and trouble swallowing.    Eyes:  Negative for photophobia, pain and visual disturbance.   Respiratory: Negative.  Negative for cough and shortness of breath.    Cardiovascular: Negative.  Negative for chest pain and palpitations.   Gastrointestinal:  Negative for constipation, diarrhea, nausea and vomiting.   Endocrine: Negative.    Genitourinary: Negative.  Negative for difficulty urinating and urgency.   Musculoskeletal: Negative.  Negative for back pain, gait problem and neck pain.   Skin: Negative.  Negative for rash.   Neurological: Negative.  Negative for dizziness, tremors, seizures, syncope, " "speech difficulty, weakness, numbness and headaches.   Hematological: Negative.    Psychiatric/Behavioral:  Negative for decreased concentration and sleep disturbance. The patient is not nervous/anxious.      I have personally reviewed the MA's review of systems and made changes as necessary.    Current Outpatient Medications on File Prior to Visit   Medication Sig Dispense Refill    ferrous sulfate 325 (65 Fe) mg tablet Take 1 tablet by mouth daily      Tamiko 0.25-35 MG-MCG per tablet       [DISCONTINUED] levETIRAcetam (KEPPRA) 500 mg tablet Take 1 tablet (500 mg total) by mouth every 12 (twelve) hours 180 tablet 3    Prenatal Vit-Fe Fumarate-FA (PRENATAL COMPLETE PO) Take 1 tablet by mouth (Patient not taking: Reported on 11/3/2023)       No current facility-administered medications on file prior to visit.         Objective   /60 (BP Location: Left arm, Patient Position: Sitting, Cuff Size: Large)   Pulse 63   Temp 97.7 °F (36.5 °C) (Temporal)   Resp 18   Ht 5' 2\" (1.575 m)   Wt 71.7 kg (158 lb)   SpO2 99%   BMI 28.90 kg/m²     Physical Exam  Constitutional:       Appearance: Normal appearance.   Eyes:      Extraocular Movements: EOM normal.      Pupils: Pupils are equal, round, and reactive to light.   Neurological:      Mental Status: She is alert and oriented to person, place, and time.      Motor: Motor strength is normal.     Coordination: Finger-Nose-Finger Test and Heel to Shin Test normal.      Gait: Gait is intact.      Deep Tendon Reflexes:      Reflex Scores:       Bicep reflexes are 2+ on the right side and 2+ on the left side.       Brachioradialis reflexes are 2+ on the right side and 2+ on the left side.       Patellar reflexes are 2+ on the right side and 2+ on the left side.       Achilles reflexes are 2+ on the right side and 2+ on the left side.  Psychiatric:         Mood and Affect: Mood normal.         Speech: Speech normal.         Behavior: Behavior normal.       Neurologic Exam "     Mental Status   Oriented to person, place, and time.   Attention: normal. Concentration: normal.   Speech: speech is normal   Level of consciousness: alert  Normal comprehension.     Cranial Nerves     CN II   Visual fields full to confrontation.     CN III, IV, VI   Pupils are equal, round, and reactive to light.  Extraocular motions are normal.     CN V   Facial sensation intact.     CN VII   Facial expression full, symmetric.     CN VIII   CN VIII normal.     CN IX, X   CN IX normal.   CN X normal.     CN XI   CN XI normal.     CN XII   CN XII normal.     Motor Exam     Strength   Strength 5/5 throughout.     Sensory Exam   Light touch normal.     Gait, Coordination, and Reflexes     Gait  Gait: normal    Coordination   Finger to nose coordination: normal  Heel to shin coordination: normal    Reflexes   Right brachioradialis: 2+  Left brachioradialis: 2+  Right biceps: 2+  Left biceps: 2+  Right patellar: 2+  Left patellar: 2+  Right achilles: 2+  Left achilles: 2+

## 2024-11-20 NOTE — ASSESSMENT & PLAN NOTE
Ms. Gonzalez is a 29 y.o. female with epilepsy due to unknown cause manifest as GTC seizures. Her neurological exam is normal.  Most recent seizure occurred in March 2021 while she was off levetiracetam shortly after discovering she was pregnant.  Seizures prior to that were on 10/2/2019 prior to initiation of AED therapy, likely in the setting of some degree sleep deprivation in the postpartum period.     She remains seizure-free and is tolerating levetiracetam well.  Again reviewed epilepsy and pregnancy.  She is on birth control and not planning pregnancy in the near future.  She knows to contact the office right away if she were to become pregnant so that we could monitor her levetiracetam levels and adjust dosing accordingly during a pregnancy.     Plan:  - continue levetiracetam 500mg BID  - call the office if there are seizures or problems with the medication  - let our office know if you become pregnant for close monitoring  - follow up in 1 year or sooner if needed  Orders:    levETIRAcetam (KEPPRA) 500 mg tablet; Take 1 tablet (500 mg total) by mouth every 12 (twelve) hours

## 2024-11-20 NOTE — PATIENT INSTRUCTIONS
Continue levetiracetam 500mg twice a day  Call the office if any seizures  Let us know if you become pregnant  Follow up in 1 year or sooner if needed

## 2025-02-15 ENCOUNTER — HOSPITAL ENCOUNTER (EMERGENCY)
Facility: HOSPITAL | Age: 30
Discharge: HOME | End: 2025-02-15
Attending: EMERGENCY MEDICINE | Admitting: EMERGENCY MEDICINE
Payer: COMMERCIAL

## 2025-02-15 ENCOUNTER — APPOINTMENT (EMERGENCY)
Dept: RADIOLOGY | Facility: HOSPITAL | Age: 30
End: 2025-02-15
Payer: COMMERCIAL

## 2025-02-15 VITALS
SYSTOLIC BLOOD PRESSURE: 119 MMHG | OXYGEN SATURATION: 99 % | RESPIRATION RATE: 21 BRPM | TEMPERATURE: 98.5 F | HEART RATE: 75 BPM | DIASTOLIC BLOOD PRESSURE: 64 MMHG

## 2025-02-15 DIAGNOSIS — S80.02XA CONTUSION OF LEFT KNEE: ICD-10-CM
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
## 2025-02-15 DIAGNOSIS — M79.642 LEFT HAND PAIN: ICD-10-CM

## 2025-02-15 DIAGNOSIS — M25.512 LEFT SHOULDER PAIN: ICD-10-CM

## 2025-02-15 PROCEDURE — 99284 EMERGENCY DEPT VISIT MOD MDM: CPT | Performed by: EMERGENCY MEDICINE

## 2025-02-15 PROCEDURE — 99284 EMERGENCY DEPT VISIT MOD MDM: CPT

## 2025-02-15 PROCEDURE — 73030 X-RAY EXAM OF SHOULDER: CPT

## 2025-02-15 PROCEDURE — 73564 X-RAY EXAM KNEE 4 OR MORE: CPT

## 2025-02-15 PROCEDURE — 73552 X-RAY EXAM OF FEMUR 2/>: CPT

## 2025-02-15 PROCEDURE — 73130 X-RAY EXAM OF HAND: CPT

## 2025-02-15 RX ORDER — ACETAMINOPHEN 325 MG/1
650 TABLET ORAL ONCE
Status: DISCONTINUED | OUTPATIENT
Start: 2025-02-15 | End: 2025-02-15 | Stop reason: HOSPADM
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
## 2025-08-01 PROBLEM — Z30.011 ENCOUNTER FOR INITIAL PRESCRIPTION OF CONTRACEPTIVE PILLS: Status: ACTIVE | Noted: 2025-08-01

## 2025-08-06 DIAGNOSIS — Z30.011 ORAL CONTRACEPTIVE PRESCRIBED: Primary | ICD-10-CM

## 2025-08-06 RX ORDER — NORGESTIMATE AND ETHINYL ESTRADIOL 0.25-0.035
1 KIT ORAL DAILY
Qty: 84 TABLET | Refills: 5 | Status: CANCELLED | OUTPATIENT
Start: 2025-08-06

## 2025-08-06 RX ORDER — NORGESTIMATE AND ETHINYL ESTRADIOL 0.25-0.035
1 KIT ORAL DAILY
Qty: 84 TABLET | Refills: 0 | Status: SHIPPED | OUTPATIENT
Start: 2025-08-06